# Patient Record
Sex: FEMALE | Race: WHITE | NOT HISPANIC OR LATINO | Employment: FULL TIME | URBAN - METROPOLITAN AREA
[De-identification: names, ages, dates, MRNs, and addresses within clinical notes are randomized per-mention and may not be internally consistent; named-entity substitution may affect disease eponyms.]

---

## 2017-01-19 PROBLEM — Z01.419 WELL WOMAN EXAM WITH ROUTINE GYNECOLOGICAL EXAM: Chronic | Status: ACTIVE | Noted: 2017-01-19

## 2020-11-11 ENCOUNTER — OFFICE VISIT (OUTPATIENT)
Dept: OBSTETRICS AND GYNECOLOGY | Facility: CLINIC | Age: 40
End: 2020-11-11

## 2020-11-11 VITALS — SYSTOLIC BLOOD PRESSURE: 118 MMHG | DIASTOLIC BLOOD PRESSURE: 70 MMHG | TEMPERATURE: 97.3 F | WEIGHT: 150 LBS

## 2020-11-11 DIAGNOSIS — N76.0 VAGINITIS AND VULVOVAGINITIS: Primary | ICD-10-CM

## 2020-11-11 DIAGNOSIS — N90.89 VULVAR LESION: ICD-10-CM

## 2020-11-11 LAB — KOH PREP NAIL: NORMAL

## 2020-11-11 PROCEDURE — 99213 OFFICE O/P EST LOW 20 MIN: CPT | Performed by: NURSE PRACTITIONER

## 2020-11-11 PROCEDURE — 87220 TISSUE EXAM FOR FUNGI: CPT | Performed by: NURSE PRACTITIONER

## 2020-11-11 NOTE — PROGRESS NOTES
CC:  Vaginal discharge, vulvar bump      Subjective   HPI  Glendy Haro is a 40 y.o. female, , who presents with evaluation of a tender bump on her labia x 3 days.  She has a hx of the same in the past 6 months.  She also reports increased amt of white discharge with mild itching x one day.        Patient's last menstrual period was 10/28/2020 (approximate).  Additional OB/GYN History       OB History        2    Para   1    Term   1            AB   1    Living           SAB        TAB        Ectopic        Molar        Multiple        Live Births              Obstetric Comments   12 - Jessica  04/10 - Miscarriage             The additional following portions of the patient's history were reviewed and updated as appropriate: allergies, current medications, past family history, past medical history, past social history, past surgical history and problem list.    Review of Systems   Constitutional: Negative for fatigue and fever.   Respiratory: Negative for cough and shortness of breath.    Cardiovascular: Negative for chest pain and leg swelling.   Gastrointestinal: Negative.    Genitourinary: Positive for vaginal discharge. Negative for decreased urine volume, dysuria, frequency, genital sores, hematuria, menstrual problem, pelvic pain, urgency, urinary incontinence, vaginal bleeding and vaginal pain.   Musculoskeletal: Negative for back pain and gait problem.   Neurological: Negative for dizziness and headache.   All other systems reviewed and are negative.    All other systems reviewed and are negative.     I have reviewed and agree with the HPI, ROS, and historical information as entered above. Nica Dykes, APRN    Objective   /70   Temp 97.3 °F (36.3 °C)   Wt 68 kg (150 lb)   LMP 10/28/2020 (Approximate)   Breastfeeding No     Physical Exam  Vitals signs and nursing note reviewed. Exam conducted with a chaperone present.   Constitutional:       Appearance: Normal  appearance.   Pulmonary:      Effort: Pulmonary effort is normal.   Genitourinary:     General: Normal vulva.      Vagina: Normal.      Cervix: Normal.      Uterus: Normal.       Adnexa: Right adnexa normal and left adnexa normal.      Comments: 2 mildly tender inclusion cysts to the distal edge of her left labia minora.  No redness or drainage.  Unable to express.  Skin:     General: Skin is warm and dry.   Neurological:      Mental Status: She is alert and oriented to person, place, and time.   Psychiatric:         Mood and Affect: Mood normal.         Behavior: Behavior normal.         Wet mount performed? Yes.  All wnl.    Assessment/Plan     Assessment and Plan    Problem List Items Addressed This Visit     None      Visit Diagnoses     Vaginitis and vulvovaginitis    -  Primary    Relevant Orders    POC KOH Prep    Vulvar lesion              D/w pt no sign of vaginal infection noted.  Rev cysts to vulva.  Wash with soap and water bid.  Enc Hibiclens.  Let air dry.  Call prn worsening or continued problem for further treatment.  Schedule annual exam.        Nica Dykes, APRN  11/11/2020

## 2021-01-13 ENCOUNTER — OFFICE VISIT (OUTPATIENT)
Dept: OBSTETRICS AND GYNECOLOGY | Facility: CLINIC | Age: 41
End: 2021-01-13

## 2021-01-13 VITALS
DIASTOLIC BLOOD PRESSURE: 70 MMHG | BODY MASS INDEX: 27.11 KG/M2 | HEIGHT: 63 IN | TEMPERATURE: 97.5 F | WEIGHT: 153 LBS | SYSTOLIC BLOOD PRESSURE: 110 MMHG

## 2021-01-13 DIAGNOSIS — N63.0 BREAST MASS IN FEMALE: Primary | ICD-10-CM

## 2021-01-13 DIAGNOSIS — Z01.419 ROUTINE GYNECOLOGICAL EXAMINATION: ICD-10-CM

## 2021-01-13 PROCEDURE — 99395 PREV VISIT EST AGE 18-39: CPT | Performed by: OBSTETRICS & GYNECOLOGY

## 2021-01-13 RX ORDER — UBROGEPANT 100 MG/1
100 TABLET ORAL DAILY PRN
COMMUNITY
Start: 2020-11-30

## 2021-01-13 NOTE — PROGRESS NOTES
GYN Annual Exam     CC - Here for annual exam.        HPI  Glendy Haro is a 40 y.o. female, , who presents for annual well woman exam. Patient's last menstrual period was 2020 (exact date)..  Periods are regular every 25-35 days, lasting 4 days. .  Dysmenorrhea:none.  Patient reports problems with: none.  There were no changes to her medical or surgical history since her last visit.. Partner Status: Marital Status: .  New Partners since last visit: no.  Desires STD Screening: no.    Additional OB/GYN History   Current contraception: contraceptive methods: None  Desires to: continue contraception  Last Pap :   Last Completed Pap Smear       Status Date      PAP SMEAR Done 2013 SCANNED - PAP SMEAR        History of abnormal Pap smear: no  Family history of uterine, colon, breast, or ovarian cancer: no  Performs monthly Self-Breast Exam: no  Exercises Regularly:no  Feelings of Anxiety or Depression: yes - TREATED BY PCP  Tobacco Usage?: No   OB History        4    Para   3    Term   3            AB   1    Living   3       SAB   0    TAB        Ectopic        Molar        Multiple        Live Births   2          Obstetric Comments   12 - Jessica  04/10 - Miscarriage             Health Maintenance   Topic Date Due   • ANNUAL PHYSICAL  1985   • TDAP/TD VACCINES (1 - Tdap) 2001   • Annual Gynecologic Pelvic and Breast Exam  05/10/2014   • INFLUENZA VACCINE  2020   • HEPATITIS C SCREENING  2020   • PAP SMEAR  2020   • Pneumococcal Vaccine 0-64  Aged Out   • MENINGOCOCCAL VACCINE  Aged Out       The additional following portions of the patient's history were reviewed and updated as appropriate: allergies, current medications, past family history, past medical history, past social history, past surgical history and problem list.    Review of Systems   Constitutional: Negative for fever, unexpected weight gain and unexpected weight loss.   Eyes:  "Negative for visual disturbance.   Respiratory: Negative for cough and shortness of breath.    Cardiovascular: Negative for chest pain.   Gastrointestinal: Negative for abdominal pain, blood in stool, constipation and diarrhea.   Endocrine: Negative for cold intolerance and heat intolerance.   Genitourinary: Negative for amenorrhea, breast discharge, breast lump, dyspareunia, menstrual problem and vaginal pain.   Musculoskeletal: Negative for back pain and myalgias.   Skin: Negative for dry skin and skin lesions.   Neurological: Negative for headache.   Psychiatric/Behavioral: Negative for suicidal ideas and depressed mood.   All other systems reviewed and are negative.    All other systems reviewed and are negative.     I have reviewed and agree with the HPI, ROS, and historical information as entered above. Yennifer Solis MD    Objective   /70   Temp 97.5 °F (36.4 °C)   Ht 160 cm (63\")   Wt 69.4 kg (153 lb)   LMP 12/25/2020 (Exact Date)   Breastfeeding No   BMI 27.10 kg/m²     Physical Exam  Vitals signs and nursing note reviewed. Exam conducted with a chaperone present.   Constitutional:       Appearance: She is well-developed.   HENT:      Head: Normocephalic and atraumatic.   Eyes:      Pupils: Pupils are equal, round, and reactive to light.   Neck:      Musculoskeletal: Normal range of motion. No muscular tenderness.      Thyroid: No thyroid mass or thyromegaly.   Pulmonary:      Effort: Pulmonary effort is normal. No retractions.   Chest:      Chest wall: No mass.      Breasts:         Right: Normal. No mass, nipple discharge, skin change or tenderness.         Left: Normal. No mass, nipple discharge, skin change or tenderness.   Abdominal:      General: Bowel sounds are normal.      Palpations: Abdomen is soft. Abdomen is not rigid. There is no mass.      Tenderness: There is no abdominal tenderness. There is no guarding.      Hernia: No hernia is present. There is no hernia in the left inguinal " area or right inguinal area.   Genitourinary:     Exam position: Lithotomy position.      Pubic Area: No rash.       Labia:         Right: No rash, tenderness or lesion.         Left: No rash, tenderness or lesion.       Urethra: No urethral pain or urethral swelling.      Vagina: Normal. No vaginal discharge or lesions.      Cervix: No cervical motion tenderness, discharge, lesion or cervical bleeding.      Uterus: Normal. Not enlarged, not fixed and not tender.       Adnexa:         Right: No mass, tenderness or fullness.          Left: No mass, tenderness or fullness.        Rectum: No external hemorrhoid.   Musculoskeletal:      Right lower leg: No edema.      Left lower leg: No edema.   Skin:     General: Skin is warm and dry.   Neurological:      Mental Status: She is alert and oriented to person, place, and time.      Motor: Motor function is intact.   Psychiatric:         Mood and Affect: Mood and affect normal.         Behavior: Behavior normal.       On left breast, RUQ, fullness, possible cyst, reports tenderness with palpation.      Assessment and Plan    Problem List Items Addressed This Visit     None      Visit Diagnoses     Routine gynecological examination    -  Primary    Relevant Orders    Liquid-based Pap Smear, Screening    Mammo Diagnostic Digital Tomosynthesis Left With CAD    Breast mass in female              1. GYN annual well woman exam.   2. Reviewed monthly self breast exams.  Instructed to call with lumps, pain, or breast discharge.    3. Reviewed BMI and weight loss as preventative health measures.   4. Recommended use of Vitamin D replacement and getting adequate calcium in her diet. (1500mg)  5. Reccommended Flu Vaccine in Fall of each year.  6. RTC in 1 year or PRN with problems  7. Other: will get left diagnostic mammogram to soo Solis MD  01/13/2021

## 2021-01-14 DIAGNOSIS — Z01.419 ROUTINE GYNECOLOGICAL EXAMINATION: ICD-10-CM

## 2021-02-02 ENCOUNTER — HOSPITAL ENCOUNTER (OUTPATIENT)
Dept: ULTRASOUND IMAGING | Facility: HOSPITAL | Age: 41
Discharge: HOME | End: 2021-02-02

## 2021-02-02 ENCOUNTER — HOSPITAL ENCOUNTER (OUTPATIENT)
Dept: MAMMOGRAPHY | Facility: HOSPITAL | Age: 41
Discharge: HOME | End: 2021-02-02

## 2021-02-02 DIAGNOSIS — Z01.419 ROUTINE GYNECOLOGICAL EXAMINATION: ICD-10-CM

## 2021-02-02 PROCEDURE — 77066 DX MAMMO INCL CAD BI: CPT

## 2021-02-02 PROCEDURE — 77062 BREAST TOMOSYNTHESIS BI: CPT | Performed by: RADIOLOGY

## 2021-02-02 PROCEDURE — 76642 ULTRASOUND BREAST LIMITED: CPT | Performed by: RADIOLOGY

## 2021-02-02 PROCEDURE — G0279 TOMOSYNTHESIS, MAMMO: HCPCS

## 2021-02-02 PROCEDURE — 77066 DX MAMMO INCL CAD BI: CPT | Performed by: RADIOLOGY

## 2021-02-02 PROCEDURE — 76642 ULTRASOUND BREAST LIMITED: CPT

## 2021-11-10 ENCOUNTER — OFFICE VISIT (OUTPATIENT)
Dept: OBSTETRICS AND GYNECOLOGY | Facility: CLINIC | Age: 41
End: 2021-11-10

## 2021-11-10 VITALS
DIASTOLIC BLOOD PRESSURE: 70 MMHG | SYSTOLIC BLOOD PRESSURE: 110 MMHG | WEIGHT: 146 LBS | HEIGHT: 63 IN | BODY MASS INDEX: 25.87 KG/M2

## 2021-11-10 DIAGNOSIS — N90.89 VULVAR LESION: Primary | ICD-10-CM

## 2021-11-10 PROCEDURE — 99213 OFFICE O/P EST LOW 20 MIN: CPT | Performed by: OBSTETRICS & GYNECOLOGY

## 2021-11-10 RX ORDER — TRETINOIN 0.5 MG/G
CREAM TOPICAL
COMMUNITY
Start: 2021-10-19

## 2021-11-10 NOTE — PROGRESS NOTES
"Chief Complaint   Patient presents with   • Bartholin's Cyst         Subjective   HPI  Glendy Haro is a 41 y.o. female, , who presents with evaluation of vulvar lump that is recurrent on both sides but today is on the left.       She states she has experienced this problem for 1 week.   Patient notes aggravating factors include sitting/standing and alleviating factors are warm bath and leftover antibiotic (2 tabs of 500mg cipro).  The area initially was about the size of a marble but is smaller and less painful today after bath last night.       Her last LMP was Patient's last menstrual period was 10/13/2021 (approximate)..  Partner Status: Marital Status: .  New Partners since last visit: no.  Desires STD Screening: no.    Additional OB/GYN History   Last Pap : neg   Last Completed Pap Smear          PAP SMEAR (Every 3 Years) Next due on 2021  Liquid-based Pap Smear, Screening    2013  SCANNED - PAP SMEAR                  The additional following portions of the patient's history were reviewed and updated as appropriate: allergies, current medications, past family history, past medical history, past social history, past surgical history and problem list.    Review of Systems   Constitutional: Negative.    HENT: Negative.    Eyes: Negative.    Respiratory: Negative.    Cardiovascular: Negative.    Gastrointestinal: Negative.    Endocrine: Negative.    Genitourinary: Positive for vaginal pain.   Musculoskeletal: Negative.    Skin: Negative.    Allergic/Immunologic: Negative.    Neurological: Negative.    Hematological: Negative.    Psychiatric/Behavioral: Negative.        I have reviewed and agree with the HPI, ROS, and historical information as entered above. Yennifer Solis MD    Objective   /70   Ht 160 cm (63\")   Wt 66.2 kg (146 lb)   LMP 10/13/2021 (Approximate)   Breastfeeding No   BMI 25.86 kg/m²     Physical Exam  Vitals and nursing note reviewed. Exam " conducted with a chaperone present.   Constitutional:       Appearance: Normal appearance. She is well-developed.   HENT:      Head: Normocephalic and atraumatic.      Nose: Nose normal. No congestion or rhinorrhea.   Eyes:      General: No scleral icterus.     Pupils: Pupils are equal, round, and reactive to light.   Pulmonary:      Effort: Pulmonary effort is normal.      Breath sounds: Normal breath sounds.   Abdominal:      General: There is no distension.      Palpations: Abdomen is soft.      Tenderness: There is no abdominal tenderness. There is no guarding or rebound.   Genitourinary:     General: Normal vulva.      Exam position: Lithotomy position.      Labia:         Right: No rash or lesion.         Left: No rash or lesion.       Urethra: No prolapse.      Vagina: Normal.      Cervix: No cervical motion tenderness, discharge, lesion or cervical bleeding.      Uterus: Normal. Not tender.       Adnexa: Right adnexa normal and left adnexa normal.        Right: No tenderness.          Left: No tenderness.        Rectum: Normal.   Musculoskeletal:         General: No swelling. Normal range of motion.      Cervical back: Normal range of motion and neck supple.   Skin:     General: Skin is warm and dry.   Neurological:      General: No focal deficit present.      Mental Status: She is alert and oriented to person, place, and time.   Psychiatric:         Mood and Affect: Mood normal.         Behavior: Behavior normal. Behavior is cooperative.     within left labia minora, there is a 6-8mm cystic area, tender to palpation, is spontaneously draining.     Assessment/Plan     Assessment     Problem List Items Addressed This Visit     None      Visit Diagnoses     Vulvar lesion    -  Primary          1. Small labial abscess that is spontaneously draining and improved. Cont sitz baths. Discussed preventative measures. She will call if problem returns.     Plan     1. Return PRN      Yennifer Solis MD  11/10/2021

## 2022-11-08 ENCOUNTER — TRANSCRIBE ORDERS (OUTPATIENT)
Dept: ADMINISTRATIVE | Facility: HOSPITAL | Age: 42
End: 2022-11-08

## 2022-11-08 DIAGNOSIS — Z12.31 VISIT FOR SCREENING MAMMOGRAM: Primary | ICD-10-CM

## 2023-01-04 ENCOUNTER — HOSPITAL ENCOUNTER (OUTPATIENT)
Dept: MAMMOGRAPHY | Facility: HOSPITAL | Age: 43
End: 2023-01-04
Payer: COMMERCIAL

## 2023-02-15 ENCOUNTER — HOSPITAL ENCOUNTER (OUTPATIENT)
Dept: MAMMOGRAPHY | Facility: HOSPITAL | Age: 43
Discharge: HOME | End: 2023-02-15
Admitting: OBSTETRICS & GYNECOLOGY
Payer: COMMERCIAL

## 2023-02-15 DIAGNOSIS — Z12.31 VISIT FOR SCREENING MAMMOGRAM: ICD-10-CM

## 2023-02-15 PROCEDURE — 77067 SCR MAMMO BI INCL CAD: CPT | Performed by: RADIOLOGY

## 2023-02-15 PROCEDURE — 77063 BREAST TOMOSYNTHESIS BI: CPT

## 2023-02-15 PROCEDURE — 77063 BREAST TOMOSYNTHESIS BI: CPT | Performed by: RADIOLOGY

## 2023-02-15 PROCEDURE — 77067 SCR MAMMO BI INCL CAD: CPT

## 2023-04-26 ENCOUNTER — OFFICE VISIT (OUTPATIENT)
Dept: OBSTETRICS AND GYNECOLOGY | Facility: CLINIC | Age: 43
End: 2023-04-26
Payer: COMMERCIAL

## 2023-04-26 VITALS
BODY MASS INDEX: 25.69 KG/M2 | SYSTOLIC BLOOD PRESSURE: 120 MMHG | DIASTOLIC BLOOD PRESSURE: 80 MMHG | HEIGHT: 63 IN | WEIGHT: 145 LBS

## 2023-04-26 DIAGNOSIS — N83.202 CYST OF LEFT OVARY: ICD-10-CM

## 2023-04-26 DIAGNOSIS — Z01.419 WOMEN'S ANNUAL ROUTINE GYNECOLOGICAL EXAMINATION: ICD-10-CM

## 2023-04-26 DIAGNOSIS — Z01.419 PAP TEST, AS PART OF ROUTINE GYNECOLOGICAL EXAMINATION: Primary | ICD-10-CM

## 2023-04-26 PROCEDURE — 99396 PREV VISIT EST AGE 40-64: CPT | Performed by: OBSTETRICS & GYNECOLOGY

## 2023-04-26 NOTE — PROGRESS NOTES
Gynecologic Annual Exam Note          GYN Annual Exam     Gynecologic Exam        Subjective     HPI  Glendy Haro is a 42 y.o. female, , who presents for annual well woman exam as a established patient . .  Patient reports problems with: none.  . She reports dysmenorrhea is mild, occurring first 1-2 days of flow. Partner Status: Marital Status: . She is is sexually active. She has not had new partners.. STD testing recommendations have been explained to the patient and she does not desire STD testing. There were no changes to her medical or surgical history since her last visit..   Patient had CT of Abdomin and showed cyst on left side 5 cm cystic lesion    Additional OB/GYN History   Current contraception: contraceptive methods: Vasectomy   Desires to: do not start contraception    Last Pap : 2021. Result: negative. HPV: not done.   Last Completed Pap Smear          Ordered - PAP SMEAR (Every 3 Years) Ordered on 2021  Liquid-based Pap Smear, Screening    2013  SCANNED - PAP SMEAR              History of abnormal Pap smear: no  Family history of uterine, colon, breast, or ovarian cancer: no  Performs monthly Self-Breast Exam: yes  Last mammogram: 02/15/2023. Done at [facility].    Last Completed Mammogram     This patient has no relevant Health Maintenance data.          History of abnormal mammogram: no    Colonoscopy: has never had a colonoscopy.  Exercises Regularly: yes  Feelings of Anxiety or Depression: no  Tobacco Usage?: No       Current Outpatient Medications:   •  ubrogepant 100 MG tablet, Take 1 tablet by mouth Daily As Needed., Disp: , Rfl:      Patient denies the need for medication refills today.    OB History        4    Para   3    Term   3            AB   1    Living   3       SAB   0    IAB        Ectopic        Molar        Multiple        Live Births   2          Obstetric Comments   12 - Jessica  04/10 - Miscarriage     "         Past Medical History:   Diagnosis Date   • Frequent UTI    • Infertility counseling     April 2011 ovul/egg retrieval - May 6 confirmed   • Migraine         History reviewed. No pertinent surgical history.    Health Maintenance   Topic Date Due   • TDAP/TD VACCINES (1 - Tdap) Never done   • HEPATITIS C SCREENING  Never done   • ANNUAL PHYSICAL  Never done   • COVID-19 Vaccine (4 - Booster for Pfizer series) 01/14/2022   • INFLUENZA VACCINE  08/01/2023   • PAP SMEAR  01/14/2024   • Annual Gynecologic Pelvic and Breast Exam  04/27/2024   • Pneumococcal Vaccine 0-64  Aged Out       The additional following portions of the patient's history were reviewed and updated as appropriate: allergies, current medications, past family history, past medical history, past social history, past surgical history and problem list.    Review of Systems   Constitutional: Negative for fever, unexpected weight gain and unexpected weight loss.   Eyes: Negative for visual disturbance.   Respiratory: Negative for cough and shortness of breath.    Cardiovascular: Negative for chest pain.   Gastrointestinal: Negative for abdominal pain, blood in stool, constipation and diarrhea.   Endocrine: Negative for cold intolerance and heat intolerance.   Genitourinary: Negative for amenorrhea, breast discharge, breast lump, dyspareunia, menstrual problem and vaginal pain.   Musculoskeletal: Negative for back pain and myalgias.   Skin: Negative for dry skin and skin lesions.   Neurological: Negative for headache.   Psychiatric/Behavioral: Negative for suicidal ideas and depressed mood.         I have reviewed and agree with the HPI, ROS, and historical information as entered above. Yennifer Solis MD      Objective   /80   Ht 160 cm (63\")   Wt 65.8 kg (145 lb)   LMP 04/03/2023   BMI 25.69 kg/m²     Physical Exam  Vitals and nursing note reviewed. Exam conducted with a chaperone present.   Constitutional:       Appearance: She is " well-developed.   HENT:      Head: Normocephalic and atraumatic.   Eyes:      Pupils: Pupils are equal, round, and reactive to light.   Neck:      Thyroid: No thyroid mass or thyromegaly.   Pulmonary:      Effort: Pulmonary effort is normal. No retractions.   Chest:      Chest wall: No mass.   Breasts:     Right: Normal. No mass, nipple discharge, skin change or tenderness.      Left: Normal. No mass, nipple discharge, skin change or tenderness.   Abdominal:      General: Bowel sounds are normal.      Palpations: Abdomen is soft. Abdomen is not rigid. There is no mass.      Tenderness: There is no abdominal tenderness. There is no guarding.      Hernia: No hernia is present. There is no hernia in the left inguinal area or right inguinal area.   Genitourinary:     Exam position: Lithotomy position.      Pubic Area: No rash.       Labia:         Right: No rash, tenderness or lesion.         Left: No rash, tenderness or lesion.       Urethra: No urethral pain or urethral swelling.      Vagina: Normal. No vaginal discharge or lesions.      Cervix: No cervical motion tenderness, discharge, lesion or cervical bleeding.      Uterus: Normal. Not enlarged, not fixed and not tender.       Adnexa:         Right: No mass, tenderness or fullness.          Left: No mass, tenderness or fullness.        Rectum: No external hemorrhoid.   Musculoskeletal:      Cervical back: Normal range of motion. No muscular tenderness.      Right lower leg: No edema.      Left lower leg: No edema.   Skin:     General: Skin is warm and dry.   Neurological:      Mental Status: She is alert and oriented to person, place, and time.      Motor: Motor function is intact.   Psychiatric:         Mood and Affect: Mood and affect normal.         Behavior: Behavior normal.            Assessment and Plan    Problem List Items Addressed This Visit    None  Visit Diagnoses     Pap test, as part of routine gynecological examination    -  Primary    Relevant  Orders    LIQUID-BASED PAP SMEAR WITH HPV GENOTYPING REGARDLESS OF INTERPRETATION (CAROLYN,COR,MAD)    US Non-ob Transvaginal    Cyst of left ovary        3cm    Relevant Orders    US Non-ob Transvaginal    US Non-ob Transvaginal    Women's annual routine gynecological examination              1. GYN annual well woman exam.   2. Pap guidelines reviewed.  3. Reviewed monthly self breast exams.  Instructed to call with lumps, pain, or breast discharge.    4. Reviewed exercise as a preventative health measures.   5. Reccommended Flu Vaccine in Fall of each year.  6. Other: cyst seems to be smaller than reported size on CT. will repeat in more months to follow and ensure resolution.   7. Return in about 3 months (around 7/26/2023) for Recheck, U/S Next Visit.     Yennifer Solis MD  04/26/2023

## 2023-05-01 LAB — REF LAB TEST METHOD: NORMAL

## 2023-07-26 ENCOUNTER — OFFICE VISIT (OUTPATIENT)
Dept: OBSTETRICS AND GYNECOLOGY | Facility: CLINIC | Age: 43
End: 2023-07-26
Payer: COMMERCIAL

## 2023-07-26 VITALS
HEIGHT: 63 IN | SYSTOLIC BLOOD PRESSURE: 102 MMHG | WEIGHT: 145 LBS | DIASTOLIC BLOOD PRESSURE: 78 MMHG | BODY MASS INDEX: 25.69 KG/M2

## 2023-07-26 DIAGNOSIS — N83.202 CYST OF LEFT OVARY: Primary | ICD-10-CM

## 2023-07-26 PROCEDURE — 99213 OFFICE O/P EST LOW 20 MIN: CPT | Performed by: OBSTETRICS & GYNECOLOGY

## 2023-07-26 NOTE — PROGRESS NOTES
"            Chief Complaint   Patient presents with    Ovarian Cyst       Subjective   HPI  Glendy Haro is a 42 y.o. female, . Her last LMP was No LMP recorded.. who presents for follow up on ovarian cyst .      At her last visit she was treated with  nothing . Since then she reports her symptoms/issue has improved. The patient reports additional symptoms as none.        Additional OB/GYN History     Last Pap : 2023   Last Completed Pap Smear            PAP SMEAR (Every 3 Years) Next due on 2023  LIQUID-BASED PAP SMEAR WITH HPV GENOTYPING REGARDLESS OF INTERPRETATION (CAROLYN,COR,MAD)    2021  Liquid-based Pap Smear, Screening    2013  SCANNED - PAP SMEAR                    Last mammogram: 02/15/2023  Last Completed Mammogram       This patient has no relevant Health Maintenance data.            Tobacco Usage?: No   OB History          4    Para   3    Term   3            AB   1    Living   3         SAB   0    IAB        Ectopic        Molar        Multiple        Live Births   2          Obstetric Comments   12 - Jessica  04/10 - Miscarriage                 Current Outpatient Medications:     ubrogepant 100 MG tablet, Take 1 tablet by mouth Daily As Needed., Disp: , Rfl:      Past Medical History:   Diagnosis Date    Frequent UTI     Infertility counseling     2011 ovul/egg retrieval - May 6 confirmed    Migraine         History reviewed. No pertinent surgical history.    The additional following portions of the patient's history were reviewed and updated as appropriate: allergies, current medications, past family history, past medical history, past social history, past surgical history, and problem list.    Review of Systems    I have reviewed and agree with the HPI, ROS, and historical information as entered above. Yennifer Solis MD      Objective   /78   Ht 160 cm (63\")   Wt 65.8 kg (145 lb)   BMI 25.69 kg/m²     Physical " Exam  Vitals and nursing note reviewed.   Constitutional:       Appearance: Normal appearance.   HENT:      Head: Normocephalic and atraumatic.   Eyes:      General: No scleral icterus.     Pupils: Pupils are equal, round, and reactive to light.   Pulmonary:      Effort: Pulmonary effort is normal.      Breath sounds: Normal breath sounds.   Abdominal:      General: Bowel sounds are normal. There is no distension.      Palpations: Abdomen is soft.      Tenderness: There is no abdominal tenderness.   Musculoskeletal:         General: Normal range of motion.      Cervical back: Normal range of motion and neck supple.      Right lower leg: No edema.      Left lower leg: No edema.   Skin:     General: Skin is warm and dry.   Neurological:      General: No focal deficit present.      Mental Status: She is alert and oriented to person, place, and time.   Psychiatric:         Mood and Affect: Mood normal.         Behavior: Behavior normal. Behavior is cooperative.       Assessment & Plan     Assessment     Problem List Items Addressed This Visit    None  Visit Diagnoses       Cyst of left ovary    -  Primary    RESOLVED            Normal US today, she feels well.     Plan     No follow-ups on file.  I spent 20 minutes caring for Glendy on this date of service. This time includes time spent by me in the following activities:preparing for the visit, reviewing tests, obtaining and/or reviewing a separately obtained history, and documenting information in the medical record        Yennifer Solis MD  07/26/2023

## 2024-04-29 ENCOUNTER — TRANSCRIBE ORDERS (OUTPATIENT)
Dept: ADMINISTRATIVE | Facility: HOSPITAL | Age: 44
End: 2024-04-29
Payer: COMMERCIAL

## 2024-04-29 DIAGNOSIS — Z12.31 SCREENING MAMMOGRAM FOR BREAST CANCER: Primary | ICD-10-CM

## 2024-05-30 ENCOUNTER — HOSPITAL ENCOUNTER (OUTPATIENT)
Dept: MAMMOGRAPHY | Facility: HOSPITAL | Age: 44
Discharge: HOME | End: 2024-05-30
Admitting: OBSTETRICS & GYNECOLOGY
Payer: COMMERCIAL

## 2024-05-30 DIAGNOSIS — Z12.31 SCREENING MAMMOGRAM FOR BREAST CANCER: ICD-10-CM

## 2024-05-30 PROCEDURE — 77067 SCR MAMMO BI INCL CAD: CPT

## 2024-05-30 PROCEDURE — 77063 BREAST TOMOSYNTHESIS BI: CPT

## 2025-04-28 ENCOUNTER — TRANSCRIBE ORDERS (OUTPATIENT)
Dept: ADMINISTRATIVE | Facility: HOSPITAL | Age: 45
End: 2025-04-28
Payer: COMMERCIAL

## 2025-04-28 DIAGNOSIS — Z12.31 VISIT FOR SCREENING MAMMOGRAM: Primary | ICD-10-CM

## 2025-06-06 LAB
NCCN CRITERIA FLAG: NORMAL
TYRER CUZICK SCORE: 13.1

## 2025-06-09 ENCOUNTER — HOSPITAL ENCOUNTER (OUTPATIENT)
Dept: MAMMOGRAPHY | Facility: HOSPITAL | Age: 45
Discharge: HOME | End: 2025-06-09
Admitting: OBSTETRICS & GYNECOLOGY
Payer: COMMERCIAL

## 2025-06-09 DIAGNOSIS — Z12.31 VISIT FOR SCREENING MAMMOGRAM: ICD-10-CM

## 2025-06-09 PROCEDURE — 77067 SCR MAMMO BI INCL CAD: CPT

## 2025-06-09 PROCEDURE — 77063 BREAST TOMOSYNTHESIS BI: CPT
